# Patient Record
(demographics unavailable — no encounter records)

---

## 2017-01-01 NOTE — PHYS DOC
Past History


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


Past Surgical History:  No Surgical History





General Pediatric Assessment


Chief Complaint


Fever


History of Present Illness





Patient is a 19 day old female who presents with her mother for evaluation of 

fever. Mother states that approximately 1 hour prior to arrival the patient 

felt warm to touch. Patient measured a rectal temperature of 100.4F. Patient's 

mother brought the patient immediately to the emergency department for further 

evaluation. Mother states that the child was born at 35 weeks and spent 5 days 

in NICU at [facility]. Patient was released after 

that time and was able to go home. Patient has had no significant events since 

discharge until today. Mother states that the child has been having liquid 

yellow stools which has been a change from previous. The child is breast-fed. 

Mother states that the child is eating and drinking normal amounts. Patient has 

not appeared short of breath and has had no episodes of difficulty breathing or 

color change with feeding.





Historian was the mother.


Review of Systems





Constitutional: Fever []


Eyes: Denies change in redness, or eye pain []


HENT: Denies nasal congestion or sore throat []


Respiratory: Denies cough or shortness of breath []


Cardiovascular: Denies color change with feeding []


GI: Diarrhea, denies bloody stools or vomiting[]


: Denies foul-smelling urine []


Musculoskeletal: Denies joint deformities or swelling


Integument: Denies rash or skin lesions []


Neurologic: Denies sensory changes []


Allergies


No known drug allergies


Physical Exam





Constitutional: Well developed, well nourished, no acute distress, non-toxic 

appearance.


HENT: Normocephalic, atraumatic, bilateral external ears normal, oropharynx 

moist, no oral exudates, nose normal.


Eyes: PERLL, EOMI, conjunctiva normal, no discharge.


Neck: Normal range of motion, no tenderness, supple, no stridor.


Cardiovascular: Normal heart rate, normal rhythm, no murmurs, no rubs, no 

gallops.


Thorax and Lungs: Normal breath sounds, no respiratory distress, no wheezing, 

no chest tenderness, no retractions, no accessory muscle use.


Abdomen: Bowel sounds normal, soft, no tenderness, no masses, no pulsatile 

masses.


Skin: Warm, dry, no erythema, no rash.


Back: No tenderness, no CVA tenderness.


Extremeties: Intact distal pulses, no tenderness, no cyanosis, no clubbing, ROM 

intact, no edema. 


Musculoskeletal: Good ROM in all major joints, no tenderness to palpation or 

major deformities noted. 


Neurologic: Alert, normal motor function, normal sensory function, no focal 

deficits noted.


Radiology/Procedures


Not performed[]


Current Patient Data





Vital Signs








  Date Time  Temp Pulse Resp B/P (MAP) Pulse Ox O2 Delivery O2 Flow Rate FiO2


 


7/10/17 20:35 98.7    99   








Laboratory Tests








Test


  7/10/17


22:35 7/10/17


22:45


 


White Blood Count 10.7 x10^3/uL  


 


Red Blood Count 4.07 x10^6/uL  


 


Hemoglobin 14.5 g/dL  


 


Hematocrit 41.7 %  


 


Mean Corpuscular Volume 102 fL  


 


Mean Corpuscular Hemoglobin 36 pg  


 


Mean Corpuscular Hemoglobin


Concent 35 g/dL 


  


 


 


Red Cell Distribution Width 15.0 %  


 


Platelet Count 418 x10^3/uL  


 


Neutrophils (%) (Auto) 16 %  


 


Lymphocytes (%) (Auto) 68 %  


 


Monocytes (%) (Auto) 9 %  


 


Eosinophils (%) (Auto) 7 %  


 


Basophils (%) (Auto) 1 %  


 


Neutrophils # (Auto) 1.7 x10^3uL  


 


Lymphocytes # (Auto) 7.2 x10^3/uL  


 


Monocytes # (Auto) 1.0 x10^3/uL  


 


Eosinophils # (Auto) 0.7 x10^3/uL  


 


Basophils # (Auto) 0.1 x10^3/uL  


 


Sodium Level 139 mmol/L  


 


Potassium Level 5.5 mmol/L  


 


Chloride Level 105 mmol/L  


 


Carbon Dioxide Level 26 mmol/L  


 


Anion Gap 8  


 


Blood Urea Nitrogen 5 mg/dL  


 


Creatinine 0.3 mg/dL  


 


Estimated GFR


(Cockcroft-Gault)  


  


 


 


Glucose Level 87 mg/dL  


 


Calcium Level 9.9 mg/dL  


 


Urine Collection Type  U cath 


 


Urine Color  Yellow 


 


Urine Clarity  Clear 


 


Urine pH  6.0 


 


Urine Specific Gravity  <=1.005 


 


Urine Protein  Neg 


 


Urine Glucose (UA)  Neg mg/dL 


 


Urine Ketones (Stick)  Neg mg/dL 


 


Urine Blood  Trace 


 


Urine Nitrite  Neg 


 


Urine Bilirubin  Neg 


 


Urine Urobilinogen Dipstick  0.2 mg/dL 


 


Urine Leukocyte Esterase  Neg 


 


Urine RBC  0 /HPF 


 


Urine WBC  Occ /HPF 


 


Urine Squamous Epithelial


Cells 


  Occ /LPF 


 


 


Urine Bacteria  0 /HPF 








Course & Med Decision Making


Pertinent Labs and Imaging studies reviewed. (See chart for details)





My physical exam reveals no acute findings on the patient in the emergency 

department at this time. Patient's rectal temperature was tested twice in the 

emergency department and both times found not to be within febrile range. I do 

remain concerned however that patient did have a measured temperature at home 

and possibility of sepsis cannot be ruled out at this time. I contacted [facility] transfer center and initially spoke with Dr. Chavez.  She 

stated that even though the patient shows no abnormal findings currently the 

patient would likely need a full septic workup and would need transfer to 

[facility].  She understood that [facility] would not 

be able to admit the patient and stated that she could be transferred as an 

admission. I was then transferred Dr. Mora, admitting hospitalist who accepted 

care patient for transfer. I spoke with patient's mother regarding plan of care 

and she was in agreement. IV access was obtained and blood and urine cultures 

were collected prior to arrival of the [facility] transport team. Patient 

remained in stable condition upon transfer from the emergency department.





Departure


Departure:


Impression:  


 Primary Impression:  


 Fever in pediatric patient


Disposition:  05 XFER OTHER


Condition:  STABLE


Referrals:  


LESLY ESPINOZA MD (PCP)











CAITLYN DUMONT MD Jul 10, 2017 22:26